# Patient Record
Sex: MALE | Race: WHITE | Employment: UNEMPLOYED | URBAN - METROPOLITAN AREA
[De-identification: names, ages, dates, MRNs, and addresses within clinical notes are randomized per-mention and may not be internally consistent; named-entity substitution may affect disease eponyms.]

---

## 2017-01-16 RX ORDER — HYDROCHLOROTHIAZIDE 12.5 MG/1
TABLET ORAL
Qty: 90 TABLET | Refills: 1 | Status: SHIPPED | OUTPATIENT
Start: 2017-01-16 | End: 2017-07-11 | Stop reason: SDUPTHER

## 2017-01-16 RX ORDER — AMLODIPINE BESYLATE 5 MG/1
TABLET ORAL
Qty: 90 TABLET | Refills: 1 | Status: SHIPPED | OUTPATIENT
Start: 2017-01-16 | End: 2017-07-11 | Stop reason: SDUPTHER

## 2017-02-06 ENCOUNTER — TELEPHONE (OUTPATIENT)
Dept: FAMILY MEDICINE CLINIC | Age: 67
End: 2017-02-06

## 2017-02-06 DIAGNOSIS — Z01.00 EYE EXAM, ROUTINE: Primary | ICD-10-CM

## 2017-06-12 ENCOUNTER — OFFICE VISIT (OUTPATIENT)
Dept: FAMILY MEDICINE CLINIC | Age: 67
End: 2017-06-12

## 2017-06-12 VITALS
SYSTOLIC BLOOD PRESSURE: 122 MMHG | RESPIRATION RATE: 16 BRPM | BODY MASS INDEX: 29.73 KG/M2 | TEMPERATURE: 98.5 F | DIASTOLIC BLOOD PRESSURE: 70 MMHG | HEIGHT: 66 IN | HEART RATE: 64 BPM | WEIGHT: 185 LBS

## 2017-06-12 DIAGNOSIS — J45.40 MODERATE PERSISTENT ASTHMA WITHOUT COMPLICATION: ICD-10-CM

## 2017-06-12 DIAGNOSIS — I10 ESSENTIAL HYPERTENSION: Primary | ICD-10-CM

## 2017-06-12 DIAGNOSIS — J44.9 CHRONIC AIRWAY OBSTRUCTION, NOT ELSEWHERE CLASSIFIED: ICD-10-CM

## 2017-06-12 PROCEDURE — 99214 OFFICE O/P EST MOD 30 MIN: CPT | Performed by: FAMILY MEDICINE

## 2017-07-12 RX ORDER — HYDROCHLOROTHIAZIDE 12.5 MG/1
TABLET ORAL
Qty: 90 TABLET | Refills: 0 | Status: SHIPPED | OUTPATIENT
Start: 2017-07-12 | End: 2017-10-14 | Stop reason: SDUPTHER

## 2017-07-12 RX ORDER — AMLODIPINE BESYLATE 5 MG/1
TABLET ORAL
Qty: 90 TABLET | Refills: 0 | Status: SHIPPED | OUTPATIENT
Start: 2017-07-12 | End: 2017-10-14 | Stop reason: SDUPTHER

## 2017-10-16 RX ORDER — AMLODIPINE BESYLATE 5 MG/1
TABLET ORAL
Qty: 90 TABLET | Refills: 0 | Status: SHIPPED | OUTPATIENT
Start: 2017-10-16 | End: 2018-01-11 | Stop reason: SDUPTHER

## 2017-10-16 RX ORDER — HYDROCHLOROTHIAZIDE 12.5 MG/1
TABLET ORAL
Qty: 90 TABLET | Refills: 0 | Status: SHIPPED | OUTPATIENT
Start: 2017-10-16 | End: 2018-01-11 | Stop reason: SDUPTHER

## 2017-12-21 ENCOUNTER — OFFICE VISIT (OUTPATIENT)
Dept: FAMILY MEDICINE CLINIC | Age: 67
End: 2017-12-21

## 2017-12-21 VITALS
TEMPERATURE: 98.2 F | DIASTOLIC BLOOD PRESSURE: 70 MMHG | BODY MASS INDEX: 30.22 KG/M2 | HEART RATE: 60 BPM | WEIGHT: 188 LBS | SYSTOLIC BLOOD PRESSURE: 120 MMHG | RESPIRATION RATE: 16 BRPM | HEIGHT: 66 IN

## 2017-12-21 DIAGNOSIS — Z13.6 SCREENING FOR AAA (AORTIC ABDOMINAL ANEURYSM): ICD-10-CM

## 2017-12-21 DIAGNOSIS — J45.909 UNCOMPLICATED ASTHMA, UNSPECIFIED ASTHMA SEVERITY, UNSPECIFIED WHETHER PERSISTENT: ICD-10-CM

## 2017-12-21 DIAGNOSIS — I10 ESSENTIAL HYPERTENSION: ICD-10-CM

## 2017-12-21 DIAGNOSIS — Z87.891 FORMER SMOKER: ICD-10-CM

## 2017-12-21 DIAGNOSIS — Z85.828 HISTORY OF SKIN CANCER: ICD-10-CM

## 2017-12-21 DIAGNOSIS — Z00.00 MEDICARE ANNUAL WELLNESS VISIT, SUBSEQUENT: ICD-10-CM

## 2017-12-21 DIAGNOSIS — Z00.00 MEDICARE ANNUAL WELLNESS VISIT, SUBSEQUENT: Primary | ICD-10-CM

## 2017-12-21 LAB
A/G RATIO: 1.4 (ref 1.1–2.2)
ALBUMIN SERPL-MCNC: 4.6 G/DL (ref 3.4–5)
ALP BLD-CCNC: 66 U/L (ref 40–129)
ALT SERPL-CCNC: 38 U/L (ref 10–40)
ANION GAP SERPL CALCULATED.3IONS-SCNC: 11 MMOL/L (ref 3–16)
AST SERPL-CCNC: 26 U/L (ref 15–37)
BILIRUB SERPL-MCNC: 0.4 MG/DL (ref 0–1)
BUN BLDV-MCNC: 19 MG/DL (ref 7–20)
CALCIUM SERPL-MCNC: 9.9 MG/DL (ref 8.3–10.6)
CHLORIDE BLD-SCNC: 98 MMOL/L (ref 99–110)
CHOLESTEROL, TOTAL: 209 MG/DL (ref 0–199)
CO2: 27 MMOL/L (ref 21–32)
CREAT SERPL-MCNC: 1.1 MG/DL (ref 0.8–1.3)
GFR AFRICAN AMERICAN: >60
GFR NON-AFRICAN AMERICAN: >60
GLOBULIN: 3.3 G/DL
GLUCOSE BLD-MCNC: 99 MG/DL (ref 70–99)
HDLC SERPL-MCNC: 54 MG/DL (ref 40–60)
LDL CHOLESTEROL CALCULATED: 124 MG/DL
POTASSIUM SERPL-SCNC: 4.7 MMOL/L (ref 3.5–5.1)
SODIUM BLD-SCNC: 136 MMOL/L (ref 136–145)
TOTAL PROTEIN: 7.9 G/DL (ref 6.4–8.2)
TRIGL SERPL-MCNC: 157 MG/DL (ref 0–150)
VLDLC SERPL CALC-MCNC: 31 MG/DL

## 2017-12-21 PROCEDURE — G0439 PPPS, SUBSEQ VISIT: HCPCS | Performed by: FAMILY MEDICINE

## 2017-12-21 PROCEDURE — 99214 OFFICE O/P EST MOD 30 MIN: CPT | Performed by: FAMILY MEDICINE

## 2017-12-21 ASSESSMENT — LIFESTYLE VARIABLES
HAS A RELATIVE, FRIEND, DOCTOR, OR ANOTHER HEALTH PROFESSIONAL EXPRESSED CONCERN ABOUT YOUR DRINKING OR SUGGESTED YOU CUT DOWN: 0
HOW OFTEN DO YOU HAVE SIX OR MORE DRINKS ON ONE OCCASION: 0
HOW OFTEN DURING THE LAST YEAR HAVE YOU HAD A FEELING OF GUILT OR REMORSE AFTER DRINKING: 0
HOW OFTEN DURING THE LAST YEAR HAVE YOU FAILED TO DO WHAT WAS NORMALLY EXPECTED FROM YOU BECAUSE OF DRINKING: 0
HOW OFTEN DURING THE LAST YEAR HAVE YOU FOUND THAT YOU WERE NOT ABLE TO STOP DRINKING ONCE YOU HAD STARTED: 0
HOW OFTEN DURING THE LAST YEAR HAVE YOU BEEN UNABLE TO REMEMBER WHAT HAPPENED THE NIGHT BEFORE BECAUSE YOU HAD BEEN DRINKING: 0
HOW OFTEN DO YOU HAVE A DRINK CONTAINING ALCOHOL: 4
HOW OFTEN DURING THE LAST YEAR HAVE YOU NEEDED AN ALCOHOLIC DRINK FIRST THING IN THE MORNING TO GET YOURSELF GOING AFTER A NIGHT OF HEAVY DRINKING: 0
AUDIT TOTAL SCORE: 4
HOW MANY STANDARD DRINKS CONTAINING ALCOHOL DO YOU HAVE ON A TYPICAL DAY: 0
AUDIT-C TOTAL SCORE: 4
HAVE YOU OR SOMEONE ELSE BEEN INJURED AS A RESULT OF YOUR DRINKING: 0

## 2017-12-21 ASSESSMENT — PATIENT HEALTH QUESTIONNAIRE - PHQ9: SUM OF ALL RESPONSES TO PHQ QUESTIONS 1-9: 1

## 2017-12-21 ASSESSMENT — ANXIETY QUESTIONNAIRES: GAD7 TOTAL SCORE: 1

## 2017-12-21 NOTE — PROGRESS NOTES
Medicare Annual Wellness Visit    Name: Kate Lr  YOB: 1950  Age: 75 y.o. Sex: male  Race/ethnicity:   MRN: [id number]     Date of Service:  12/21/2017    Patient Active Problem List   Diagnosis    Hypertension    COPD mild by PFT 3/09    Asthma    Actinic keratosis    CTS (carpal tunnel syndrome)    Medicare annual wellness visit, subsequent    Colon polyp    Biceps tendon rupture- left 2014    History of skin cancer     Health Maintenance   Topic Date Due    AAA screen  1950    DTaP/Tdap/Td vaccine (2 - Td) 10/30/2018    Colon cancer screen colonoscopy  02/04/2020    Lipid screen  12/07/2021    Zostavax vaccine  Completed    Flu vaccine  Completed    Pneumococcal low/med risk  Completed    Hepatitis C screen  Completed      Chief Complaint:   Kate Lr is a 75 y.o. male who presents for Medicare Annual Wellness Visit and check-up for:       Essential hypertension     Uncomplicated asthma, unspecified asthma severity, unspecified whether persistent     History of skin cancer      Review of Systems   Review of Systems - General ROS: negative for - fever or night sweats  Ophthalmic ROS: negative for - eye pain or loss of vision  ENT ROS: negative for - hearing change or sore throat  Hematological and Lymphatic ROS: negative for - bruising or weight loss  Endocrine ROS: negative for - malaise/lethargy or unexpected weight changes  Respiratory ROS: no cough, shortness of breath, or wheezing  Cardiovascular ROS: no chest pain or dyspnea on exertion  Gastrointestinal ROS: no abdominal pain, change in bowel habits, or black or bloody stools  Genito-Urinary ROS: no dysuria, trouble voiding, or hematuria  Dermatological ROS: negative for - rash or skin lesion changes    Screenings for behavioral, psychosocial and functional/safety risks, and cognitive dysfunction are all negative except as indicated below.  These results, as well as other patient data from the Health Risk Assessment form, are documented in Flowsheets linked to this Encounter. Allergies   Allergen Reactions    Ace Inhibitors      dizzy    Penicillins       Prior to Visit Medications    Medication Sig Taking? Authorizing Provider   amLODIPine (NORVASC) 5 MG tablet TAKE 1 TABLET DAILY Yes River Fernandez MD   hydrochlorothiazide (HYDRODIURIL) 12.5 MG tablet TAKE 1 TABLET EVERY MORNING Yes River Fernandez MD   ADVAIR DISKUS 100-50 MCG/DOSE diskus inhaler USE 1 INHALATION TWICE A DAY Yes River Fernandez MD   albuterol (PROVENTIL HFA;VENTOLIN HFA) 108 (90 BASE) MCG/ACT inhaler Inhale 2 puffs into the lungs every 4 hours as needed for Wheezing May substitute for insurance preferred (Ventolin, Proventil, ProAir) Yes River Fernandez MD   aspirin 81 MG tablet Take 81 mg by mouth daily. Yes Historical Provider, MD   MULTIPLE VITAMINS PO Take  by mouth.    Yes Historical Provider, MD     Patient Active Problem List   Diagnosis    Hypertension    COPD mild by PFT 3/09    Asthma    Actinic keratosis    CTS (carpal tunnel syndrome)    Medicare annual wellness visit, subsequent    Colon polyp    Biceps tendon rupture- left 2014    History of skin cancer     Past Medical History:   Diagnosis Date    Asthma     bronchial    CTS (carpal tunnel syndrome) 8/23/2011    Hypertension     S/P LASIK surgery 1998     Social History     Social History    Marital status:      Spouse name: N/A    Number of children: N/A    Years of education: N/A     Social History Main Topics    Smoking status: Former Smoker     Types: Cigarettes     Start date: 1/1/1965     Quit date: 1/1/1971    Smokeless tobacco: Never Used      Comment: quit 1971    Alcohol use 5.0 oz/week     10 Standard drinks or equivalent per week      Comment: 1-2-daily    Drug use: No    Sexual activity: Yes     Partners: Female     Birth control/ protection: Post-menopausal      Comment:      Other Topics Concern    None     Social History Narrative    Exercise: walking and cardiovascular equipment almost daily. Seatbelt use: Always. Living will: yes, copy requested. Self-testicular exams: Yes      Past Surgical History:   Procedure Laterality Date    LASIK  1998    visioncorrection correction surgery    MALIGNANT SKIN LESION EXCISION  09/19/2017    Chest     SKIN BIOPSY  01/09    nose well-diff squm carc with superficial invasion excised    TONSILLECTOMY       Family History   Problem Relation Age of Onset    High Blood Pressure Mother      Patient's medications, allergies, past medical, surgical, social and family histories were reviewed and updated as appropriate. CareTeam (Including outside providers/suppliers regularly involved in providing care):   Patient Care Team:  Monisha Rajput MD as PCP - General (Family Medicine)  Monisha Rajput MD as PCP - [facility] Attributed Provider  Reyna Alonso as Consulting Physician (Dermatology)    Wt Readings from Last 3 Encounters:   12/21/17 188 lb (85.3 kg)   06/12/17 185 lb (83.9 kg)   12/07/16 181 lb (82.1 kg)     Vitals:    12/21/17 0937   BP: 120/70   Site: Left Arm   Position: Sitting   Cuff Size: Large Adult   Pulse: 60   Resp: 16   Temp: 98.2 °F (36.8 °C)   TempSrc: Oral   Weight: 188 lb (85.3 kg)   Height: 5' 5.5\" (1.664 m)     The following problems were reviewed today and where indicated follow up appointments were made and/or referrals ordered.     Risk Factor Screenings with Interventions   Please see Screenings under MA note    Fall Risk Interventions:    · None indicated  Depression Interventions:  · None indicated  Anxiety Interventions:  · None indicated  Cognitive Impairment Interventions:  · None indicated    Substance Abuse:  Social History     Social History    Marital status:      Spouse name: N/A    Number of children: N/A    Years of education: N/A     Social History Main Topics    Smoking status: Former Smoker     Types: Cigarettes     Start date: 1/1/1965 Quit date: 1/1/1971    Smokeless tobacco: Never Used      Comment: quit 1971    Alcohol use 5.0 oz/week     10 Standard drinks or equivalent per week      Comment: 1-2-daily    Drug use: No    Sexual activity: Yes     Partners: Female     Birth control/ protection: Post-menopausal      Comment:      Other Topics Concern    None     Social History Narrative    Exercise: walking and cardiovascular equipment almost daily. Seatbelt use: Always. Living will: yes, copy requested. Self-testicular exams: Yes       Substance Abuse Interventions:  · None indicated    Health Risk Assessment:   Please see Screenings under MA note  General Health Risk Interventions:  · Stress: patient declines any further evaluation/treatment for this issue, due to political climate  · Anger: patient declines any further evaluation/treatment for this issue     Wt Readings from Last 5 Encounters:   12/21/17 188 lb (85.3 kg)   06/12/17 185 lb (83.9 kg)   12/07/16 181 lb (82.1 kg)   09/19/16 178 lb (80.7 kg)   06/06/16 181 lb (82.1 kg)      Body mass index is 30.81 kg/m².    Health Habits/Nutrition Interventions:  · None indicated     Hearing/Vision Interventions:  · None indicated; see optho annually     Safety Interventions:  · None indicated     ADL Interventions:  · None indicated just not good cook    Personalized Preventive Plan   Current Health Maintenance Status  Immunization History   Administered Date(s) Administered    Influenza Vaccine, unspecified formulation 10/01/2016    Influenza Virus Vaccine 10/02/2012    Influenza, High Dose 10/12/2015, 10/01/2017    Pneumococcal 13-valent Conjugate ([id number]) 11/19/2014    Pneumococcal Polysaccharide ([id number]) 12/02/2015    Tdap (Boostrix, Adacel) 10/30/2008    Zoster 11/07/2012     Health Maintenance   Topic Date Due    AAA screen  1950    DTaP/Tdap/Td vaccine (2 - Td) 10/30/2018    Colon cancer screen colonoscopy  02/04/2020    Lipid screen  12/07/2021    0937   BP: 120/70   Site: Left Arm   Position: Sitting   Cuff Size: Large Adult   Pulse: 60   Resp: 16   Temp: 98.2 °F (36.8 °C)   TempSrc: Oral   Weight: 188 lb (85.3 kg)   Height: 5' 5.5\" (1.664 m)     Body mass index is 30.81 kg/m². GENERAL: well-developed, well-nourished, alert, no distress, calm, assistive device: none    EYES: negative findings: lids and lashes normal and conjunctivae and sclerae normal  ENT: normal TM's and external ear canals both ears  · External nose and ears appear normal  · Pharynx: normal. Exudates: None  · Lips, mucosa, and tongue normal and teeth normal   · Hearing grossly normal.     NECK: No adenopathy, supple, symmetrical, trachea midline  · Thyroid not enlarged, symmetric, no tenderness/mass/nodules  · no cervical nodes, no supraclavicular nodes  LUNGS:  Breathing unlabored  · clear to auscultation bilaterally and good air movement  CARDIOVASC: regular rate and rhythm, S1, S2 normal, no murmur, click, rub or gallop  · Apical impulse normal  LEGS:  Lower extremity edema: none    · No carotid bruits  ABDOMEN: Soft, non-tender, no masses  · No hepatosplenomegaly  · No hernias noted. Exam limited by N/A  SKIN: warm and dry  · No rashes or suspicious lesions  PSYCH:  Alert and oriented  · Normal reasoning, insight good  · Facial expressions full, mood appropriate  · No memory disturbance noted  MUSCULOSKEL:  Gait  normal  · No significant finger or nail findings  · Spine symmetric, no deformities, no kyphosis   GAIT: UP and Go test: <30 seconds with gait: normal.  Speed Normal.  No significant balance checks. No extra steps on turn around. Assistive device: None     Assessment and Plan:     1. Medicare annual wellness visit, subsequent  Comprehensive Metabolic Panel    Lipid Panel   2. Essential hypertension  Comprehensive Metabolic Panel    Lipid Panel   3. Uncomplicated asthma, unspecified asthma severity, unspecified whether persistent     4. History of skin cancer     5. Screening for AAA (aortic abdominal aneurysm)     6. Former smoker  US Abdominal Aorta     Diagnosis Risk: Moderate Risk    FYI: While Medicare provides you with a FREE ANNUAL PREVENTIVE PHYSICAL, this visit does NOT include management of chronic medical problems or physical examination. Dr. Simona Arambula usually does a combination visit if you have other medical problems so you don't have to come back for another visit. However, this means that there will be a co-pay. INSTRUCTIONS  NEXT APPOINTMENT: Please schedule check-up in 6 months. PLEASE GET BLOODWORK DRAWN TODAY ON FIRST FLOOR in 170. Take orders with you. RESULTS- most blood tests back in couple days. We will call you if any problems. If bloodwork good, you will get letter in mail or notified thru [address] (if signed up) within 2 weeks. If you do not, please call office. Get records release form completed and sent to dermatology. Get ultrasound to scree for AAA  Eat less, move more! You can do it!       Personalized Preventive Plan  Health Maintenance Due   Topic Date Due    AAA screen  1950     Other Preventive Recommendations:  · A preventive eye exam performed by an eye specialist is recommended every 1-2 years to screen for glaucoma; cataracts, macular degeneration, and other eye disorders  · A preventive dental visit is recommended every 6 months  · Try to get at least 150 minutes of exercise per week or 10,000 steps per day on a pedometer  · Order FREE \"Exercise and Physical Activity\" book from the [employer] on Agin8-344-879-7464 or https://order.monty.nih.gov/health/publication/order/  · You need 9334-4238 mg of calcium and 5614-2362 IU of vitamin D per day- it is possible to meet your calcium requirement with diet alone, but a vitamin D supplement is usually necessary to meet this goal  · When exposed to the sun, use a sunscreen that protects against both UVA and UVB radiation with an SPF of 30 or greater- reapply every 2 to 3 hours or after sweating, drying off with a towel, or swimming  · Always wear a seat belt when traveling in a car, and a helmet when riding a bicycle or motorcycle

## 2017-12-21 NOTE — PATIENT INSTRUCTIONS
FYI: While Medicare provides you with a FREE ANNUAL PREVENTIVE PHYSICAL, this visit does NOT include management of chronic medical problems or physical examination. Dr. Cristian Esquivel usually does a combination visit if you have other medical problems so you don't have to come back for another visit. However, this means that there will be a co-pay. INSTRUCTIONS  NEXT APPOINTMENT: Please schedule check-up in 6 months. PLEASE GET BLOODWORK DRAWN TODAY ON FIRST FLOOR in 170. Take orders with you. RESULTS- most blood tests back in couple days. We will call you if any problems. If bloodwork good, you will get letter in mail or notified thru [address] (if signed up) within 2 weeks. If you do not, please call office. Get records release form completed and sent to dermatology. Get ultrasound to scree for AAA  Eat less, move more! You can do it!       Patient Education     Personalized Preventive Plan  Health Maintenance Due   Topic Date Due    AAA screen  1950     Other Preventive Recommendations:  · A preventive eye exam performed by an eye specialist is recommended every 1-2 years to screen for glaucoma; cataracts, macular degeneration, and other eye disorders  · A preventive dental visit is recommended every 6 months  · Try to get at least 150 minutes of exercise per week or 10,000 steps per day on a pedometer  · Order FREE \"Exercise and Physical Activity\" book from the [facility] on Agin1-512.681.1929 or https://order.monty.nih.gov/health/publication/order/  · You need 4923-4149 mg of calcium and 2499-1028 IU of vitamin D per day- it is possible to meet your calcium requirement with diet alone, but a vitamin D supplement is usually necessary to meet this goal  · When exposed to the sun, use a sunscreen that protects against both UVA and UVB radiation with an SPF of 30 or greater- reapply every 2 to 3 hours or after sweating, drying off with a towel, or swimming  · Always wear a seat belt when traveling in a car, and a helmet when riding a bicycle or motorcycle        ABDOMINAL AORTIC ANEURYSM    Overview   What is an abdominal aortic aneurysm (AAA)? The main blood vessel in your body is the aorta (say: a-or-ta). It is a long blood vessel that reaches from your chest into your abdomen. It carries blood from your heart to the rest of your body. The part of the aorta in your abdomen is called the abdominal aorta. It supplies blood to your stomach, pelvis and legs. An aneurysm (say: an-yur-izm) is a weak area in a blood vessel. If a blood vessel weakens, it starts to swell like a balloon and becomes abnormally large. If an aneurysm forms on your abdominal aorta and grows too large, your aorta may tear or burst.      Symptoms   What are the symptoms of an AAA? As the aneurysm develops, there are usually no symptoms. When the aneurysm leaks or tears, you may experience the following:  Sudden pain in your abdomen, groin, back, legs or buttocks   Nausea and vomiting   Abnormal stiffness in your abdominal muscles   Swelling or bulging in one area of your abdomen   Clammy skin     Causes & Risk Factors   Who gets an AAA? Doctors dont know what exactly causes an AAA, but aneurysms in general are more common in older people, especially men older than 61years of age. AAA may also run in families. The following risk factors also can increase your chances of developing an AAA:  High blood pressure   Being overweight or obese   Smoking   High cholesterol   Emphysema     Diagnosis & Tests   How can I tell if I have an AAA? Talk to your doctor if you have a higher risk of an AAA or if you have any of the symptoms. Your doctor may order a test called an ultrasound. An ultrasound provides a picture of your organs by passing sound waves through your body. It can help measure the size of your aorta. Treatment   How is an AAA treated?   If your aneurysm is large or is growing quickly, you will most likely need surgery. If your aneurysm is small, your doctor may just monitor it using ultrasound tests. It is important to prevent the aneurysm from bursting or tearing. You can help by quitting smoking and by working with your doctor to control your blood pressure and cholesterol.

## 2018-01-12 RX ORDER — HYDROCHLOROTHIAZIDE 12.5 MG/1
TABLET ORAL
Qty: 90 TABLET | Refills: 1 | Status: SHIPPED | OUTPATIENT
Start: 2018-01-12 | End: 2018-07-19 | Stop reason: SDUPTHER

## 2018-01-12 RX ORDER — AMLODIPINE BESYLATE 5 MG/1
TABLET ORAL
Qty: 90 TABLET | Refills: 1 | Status: SHIPPED | OUTPATIENT
Start: 2018-01-12 | End: 2018-07-19 | Stop reason: SDUPTHER

## 2018-06-25 ENCOUNTER — OFFICE VISIT (OUTPATIENT)
Dept: FAMILY MEDICINE CLINIC | Age: 68
End: 2018-06-25

## 2018-06-25 VITALS
WEIGHT: 186 LBS | BODY MASS INDEX: 29.89 KG/M2 | HEIGHT: 66 IN | DIASTOLIC BLOOD PRESSURE: 80 MMHG | RESPIRATION RATE: 16 BRPM | HEART RATE: 78 BPM | SYSTOLIC BLOOD PRESSURE: 124 MMHG | TEMPERATURE: 98.5 F

## 2018-06-25 DIAGNOSIS — Z23 NEED FOR SHINGLES VACCINE: ICD-10-CM

## 2018-06-25 DIAGNOSIS — J45.909 UNCOMPLICATED ASTHMA, UNSPECIFIED ASTHMA SEVERITY, UNSPECIFIED WHETHER PERSISTENT: ICD-10-CM

## 2018-06-25 DIAGNOSIS — M79.605 LEG PAIN, LEFT: ICD-10-CM

## 2018-06-25 DIAGNOSIS — J44.9 CHRONIC OBSTRUCTIVE PULMONARY DISEASE, UNSPECIFIED COPD TYPE (HCC): ICD-10-CM

## 2018-06-25 DIAGNOSIS — I10 ESSENTIAL HYPERTENSION: Primary | ICD-10-CM

## 2018-06-25 PROCEDURE — 99214 OFFICE O/P EST MOD 30 MIN: CPT | Performed by: FAMILY MEDICINE

## 2018-06-25 ASSESSMENT — PATIENT HEALTH QUESTIONNAIRE - PHQ9
1. LITTLE INTEREST OR PLEASURE IN DOING THINGS: 0
SUM OF ALL RESPONSES TO PHQ9 QUESTIONS 1 & 2: 0
2. FEELING DOWN, DEPRESSED OR HOPELESS: 0
SUM OF ALL RESPONSES TO PHQ QUESTIONS 1-9: 0

## 2018-07-19 RX ORDER — HYDROCHLOROTHIAZIDE 12.5 MG/1
TABLET ORAL
Qty: 90 TABLET | Refills: 1 | Status: SHIPPED | OUTPATIENT
Start: 2018-07-19 | End: 2019-01-02 | Stop reason: SDUPTHER

## 2018-07-19 RX ORDER — AMLODIPINE BESYLATE 5 MG/1
TABLET ORAL
Qty: 90 TABLET | Refills: 1 | Status: SHIPPED | OUTPATIENT
Start: 2018-07-19 | End: 2019-01-02 | Stop reason: SDUPTHER

## 2019-01-02 RX ORDER — HYDROCHLOROTHIAZIDE 12.5 MG/1
TABLET ORAL
Qty: 90 TABLET | Refills: 1 | Status: SHIPPED | OUTPATIENT
Start: 2019-01-02 | End: 2019-07-09 | Stop reason: SDUPTHER

## 2019-01-02 RX ORDER — AMLODIPINE BESYLATE 5 MG/1
TABLET ORAL
Qty: 90 TABLET | Refills: 1 | Status: SHIPPED | OUTPATIENT
Start: 2019-01-02 | End: 2019-07-09 | Stop reason: SDUPTHER

## 2019-03-13 ENCOUNTER — OFFICE VISIT (OUTPATIENT)
Dept: FAMILY MEDICINE CLINIC | Age: 69
End: 2019-03-13
Payer: MEDICARE

## 2019-03-13 VITALS
TEMPERATURE: 98.3 F | SYSTOLIC BLOOD PRESSURE: 138 MMHG | RESPIRATION RATE: 18 BRPM | BODY MASS INDEX: 30.7 KG/M2 | WEIGHT: 191 LBS | DIASTOLIC BLOOD PRESSURE: 88 MMHG | HEIGHT: 66 IN | HEART RATE: 90 BPM

## 2019-03-13 DIAGNOSIS — I10 HYPERTENSION, ESSENTIAL: Chronic | ICD-10-CM

## 2019-03-13 DIAGNOSIS — J44.9 CHRONIC OBSTRUCTIVE PULMONARY DISEASE, UNSPECIFIED COPD TYPE (HCC): ICD-10-CM

## 2019-03-13 DIAGNOSIS — Z86.010 HX OF COLONIC POLYP: ICD-10-CM

## 2019-03-13 DIAGNOSIS — Z91.89 CARDIOVASCULAR EVENT RISK: ICD-10-CM

## 2019-03-13 DIAGNOSIS — Z00.00 MEDICARE ANNUAL WELLNESS VISIT, SUBSEQUENT: Primary | Chronic | ICD-10-CM

## 2019-03-13 DIAGNOSIS — M79.671 HEEL PAIN, CHRONIC, RIGHT: ICD-10-CM

## 2019-03-13 DIAGNOSIS — Z85.828 HISTORY OF SKIN CANCER: ICD-10-CM

## 2019-03-13 DIAGNOSIS — G89.29 HEEL PAIN, CHRONIC, RIGHT: ICD-10-CM

## 2019-03-13 PROBLEM — M79.605 LEG PAIN, LEFT: Status: RESOLVED | Noted: 2018-06-25 | Resolved: 2019-03-13

## 2019-03-13 LAB
A/G RATIO: 1.4 (ref 1.1–2.2)
ALBUMIN SERPL-MCNC: 4.4 G/DL (ref 3.4–5)
ALP BLD-CCNC: 73 U/L (ref 40–129)
ALT SERPL-CCNC: 34 U/L (ref 10–40)
ANION GAP SERPL CALCULATED.3IONS-SCNC: 14 MMOL/L (ref 3–16)
AST SERPL-CCNC: 23 U/L (ref 15–37)
BILIRUB SERPL-MCNC: 0.6 MG/DL (ref 0–1)
BUN BLDV-MCNC: 17 MG/DL (ref 7–20)
CALCIUM SERPL-MCNC: 9.6 MG/DL (ref 8.3–10.6)
CHLORIDE BLD-SCNC: 101 MMOL/L (ref 99–110)
CHOLESTEROL, TOTAL: 177 MG/DL (ref 0–199)
CO2: 24 MMOL/L (ref 21–32)
CREAT SERPL-MCNC: 1.1 MG/DL (ref 0.8–1.3)
GFR AFRICAN AMERICAN: >60
GFR NON-AFRICAN AMERICAN: >60
GLOBULIN: 3.2 G/DL
GLUCOSE BLD-MCNC: 112 MG/DL (ref 70–99)
HDLC SERPL-MCNC: 55 MG/DL (ref 40–60)
LDL CHOLESTEROL CALCULATED: 108 MG/DL
POTASSIUM SERPL-SCNC: 4.5 MMOL/L (ref 3.5–5.1)
SODIUM BLD-SCNC: 139 MMOL/L (ref 136–145)
TOTAL PROTEIN: 7.6 G/DL (ref 6.4–8.2)
TRIGL SERPL-MCNC: 68 MG/DL (ref 0–150)
VLDLC SERPL CALC-MCNC: 14 MG/DL

## 2019-03-13 PROCEDURE — G0439 PPPS, SUBSEQ VISIT: HCPCS | Performed by: FAMILY MEDICINE

## 2019-03-13 PROCEDURE — 99214 OFFICE O/P EST MOD 30 MIN: CPT | Performed by: FAMILY MEDICINE

## 2019-03-13 RX ORDER — AMOXICILLIN 500 MG
1200 CAPSULE ORAL
COMMUNITY

## 2019-03-13 ASSESSMENT — PATIENT HEALTH QUESTIONNAIRE - PHQ9
SUM OF ALL RESPONSES TO PHQ QUESTIONS 1-9: 2
SUM OF ALL RESPONSES TO PHQ QUESTIONS 1-9: 2

## 2019-03-14 PROBLEM — R73.9 HYPERGLYCEMIA: Status: ACTIVE | Noted: 2019-03-14

## 2019-03-19 ENCOUNTER — PATIENT MESSAGE (OUTPATIENT)
Dept: FAMILY MEDICINE CLINIC | Age: 69
End: 2019-03-19

## 2019-07-11 RX ORDER — AMLODIPINE BESYLATE 5 MG/1
TABLET ORAL
Qty: 90 TABLET | Refills: 0 | Status: SHIPPED | OUTPATIENT
Start: 2019-07-11

## 2019-07-11 RX ORDER — HYDROCHLOROTHIAZIDE 12.5 MG/1
TABLET ORAL
Qty: 90 TABLET | Refills: 0 | Status: SHIPPED | OUTPATIENT
Start: 2019-07-11